# Patient Record
(demographics unavailable — no encounter records)

---

## 2024-11-26 NOTE — HISTORY OF PRESENT ILLNESS
[FreeTextEntry6] : 4 years old female presenting one week ago with runny nose, cough and headache with reduced appetite. Patient was seen in urgent care on 11/20 and was diagnosed with bilateral acute otitis media. An RVP was done and was positive for RSV. Patient was discharged on amoxicillin for 7 days. Mother notes overall improvement, PO intake at baseline and decreasing ear pain. Mother continued pulm medication as prescribed during episode. Denies fever, rash, nausea, vomiting or diarrhea, no sick contacts.

## 2024-11-26 NOTE — DISCUSSION/SUMMARY
[FreeTextEntry1] : 4 years old female with PMH of reactive airway presenting following diagnosis of RSV and bilateral acute otitis media at urgent care on 11/20. Patient completed a course of amoxicillin for 7 days. Physical exam is remarkable for nasal congestion, clear lungs and dull ears with clear fluid bilaterally and mild erythematous tympanic membrane in the left ear. Supportive care advised.  Advised to return to clinic if symptoms persist and go to the ED if worsening pain or respiratory distress.

## 2024-11-26 NOTE — PHYSICAL EXAM
[Clear Effusion] : clear effusion [NL] : warm, clear [Clear] : right tympanic membrane not clear [Erythema] : no erythema

## 2025-01-22 NOTE — REVIEW OF SYSTEMS
[NI] : Allergic [Nl] : Hematologic/Lymphatic [Constipation] : constipation [Developmental Delay] : developmental delay [Frequent URIs] : no frequent upper respiratory infections [Snoring] : no snoring [Apnea] : no apnea [Restlessness] : no restlessness [Daytime Sleepiness] : no daytime sleepiness [Daytime Hyperactivity] : no daytime hyperactivity [Voice Changes] : no voice changes [Frequent Croup] : no frequent croup [Chronic Hoarseness] : no chronic hoarseness [Rhinorrhea] : no rhinorrhea [Nasal Congestion] : no nasal congestion [Sinus Problems] : no sinus problems [Postnasl Drip] : no postnasal drip [Epistaxis] : no epistaxis [Recurrent Ear Infections] : no recurrent ear infections [Recurrent Sinus Infections] : no recurrent sinus infections [Recurrent Throat Infections] : no recurrent throat infections [Tachypnea] : not tachypneic [Wheezing] : no wheezing [Cough] : no cough [Shortness of Breath] : no shortness of breath [Bronchitis] : no bronchitis [Pneumonia] : no pneumonia [Hemoptysis] : no hemoptysis [Sputum] : no sputum [Chronically Infected with ___] : no chronic infections [Spitting Up] : not spitting up [Problems Swallowing] : no problems swallowing [Abdominal Pain] : no abdominal pain [Diarrhea] : no diarrhea [Foul Smelling Stool] : no foul smelling stool [Oily Stool] : no oily stool [Reflux] : no reflux [Nausea] : no nausea [Vomiting] : no vomiting [Food Intolerance] : food tolerant [Abdomen Distention] : abdomen not distended [Rectal Prolapse] : no rectal prolapse [Urgency] : no feelings of urinary urgency [Dysuria] : no dysuria [Muscle Weakness] : no muscle weakness [Seizure] : no seizures [Brain Hemorrhage] : no brain hemorrhage [Head Injury] : no head injury [Sleep Disturbances] : ~T no sleep disturbances [Hyperactive] : no hyperactive behavior [Failure To Thrive] : no failure to thrive [Short Stature] : short stature was not noted

## 2025-01-22 NOTE — CONSULT LETTER
[Dear  ___] : Dear  [unfilled], [Consult Letter:] : I had the pleasure of evaluating your patient, [unfilled]. [Please see my note below.] : Please see my note below. [Consult Closing:] : Thank you very much for allowing me to participate in the care of this patient.  If you have any questions, please do not hesitate to contact me. [Sincerely,] : Sincerely, [FreeTextEntry3] : Wenceslao Hannah MD Pediatric Pulmonology and Sleep Medicine Director [facility] , Pediatric Sleep Disorders,  of Pediatrics, [facility] at [facility], [address] [place] [id number] (P)[phone] (P) [phone] (F) [phone]

## 2025-01-22 NOTE — ASSESSMENT
[FreeTextEntry1] : Impression: Reactive airways disease, vit D deficiency, developmental delay, nonallergic rhinitis, constipation  Reactive airways disease: As mother has a history of asthma, the child's asthma predictive index is positive. Fluticasone 44 was prescribed, 2 puffs twice daily with a spacer and mask.  Albuterol is to be administered prior to activity and every 4 hours as needed.  She had received the influenza vaccine.  If she continues to do well, I plan to start weaning her off routine anti-inflammatory medication.  Constipation: She is receiving a quarter capful of MiraLAX in 8 ounces water once daily. Nonallergic rhinitis:  Cetirizine is to be administered as needed.  Vitamin D insufficiency:  Vitamin D3 prescribed, 2000 international units daily.   Over 50% of time was spent in counseling.  I asked mother to bring the child back for a follow-up visit in 4 month's time. Dictation generated through [employer] [facility]. Note not proofed and edited.

## 2025-01-22 NOTE — PHYSICAL EXAM
[Well Nourished] : well nourished [Well Developed] : well developed [Alert] : ~L alert [Active] : active [No Allergic Shiners] : no allergic shiners [No Drainage] : no drainage [No Conjunctivitis] : no conjunctivitis [Tympanic Membranes Clear] : tympanic membranes were clear [No Nasal Drainage] : no nasal drainage [No Polyps] : no polyps [No Sinus Tenderness] : no sinus tenderness [No Oral Pallor] : no oral pallor [No Oral Cyanosis] : no oral cyanosis [No Exudates] : no exudates [No Postnasal Drip] : no postnasal drip [Tonsil Size ___] : tonsil size [unfilled] [No Tonsillar Enlargement] : no tonsillar enlargement [No Stridor] : no stridor [Absence Of Retractions] : absence of retractions [Symmetric] : symmetric [Good Expansion] : good expansion [No Acc Muscle Use] : no accessory muscle use [Good aeration to bases] : good aeration to bases [Equal Breath Sounds] : equal breath sounds bilaterally [No Crackles] : no crackles [No Rhonchi] : no rhonchi [No Wheezing] : no wheezing [Normal Sinus Rhythm] : normal sinus rhythm [No Heart Murmur] : no heart murmur [Soft, Non-Tender] : soft, non-tender [No Hepatosplenomegaly] : no hepatosplenomegaly [Non Distended] : was not ~L distended [Abdomen Mass (___ Cm)] : no abdominal mass palpated [Abdomen Hernia] : no hernia was discovered [Full ROM] : full range of motion [No Clubbing] : no clubbing [Capillary Refill < 2 secs] : capillary refill less than two seconds [No Cyanosis] : no cyanosis [No Petechiae] : no petechiae [No Kyphoscoliosis] : no kyphoscoliosis [No Contractures] : no contractures [Alert and  Oriented] : alert and oriented [Abnormal Walk] : normal gait [No Abnormal Focal Findings] : no abnormal focal findings [Normal Muscle Tone And Reflexes] : normal muscle tone and reflexes [No Birth Marks] : no birth marks [No Rashes] : no rashes [No Skin Ulcers] : no skin ulcers

## 2025-01-22 NOTE — HISTORY OF PRESENT ILLNESS
[FreeTextEntry1] : This 4-year-old was seen for a follow-up visit.   She was receiving fluticasone 44 2 puffs twice daily with a spacer and mask.  Though we had filled this out for the child to receive this at school, mother states that she is administering this.  She was seen at urgent care in November 2024 and was treated with Amoxil for otitis.  She was positive for the respiratory syncytial virus.  When she is well she does not cough at night and is not nasally congested.  She receives albuterol prior to activity and tolerates activity well.  She had received the influenza vaccine.  She receives MiraLAX and has normal bowel movements.  She drinks 3 glasses of milk a day. As she does well in the summer, fluticasone and albuterol were prescribed to be administered as needed.    When she is well, she has a history of coughing at night once a week.  She receives albuterol prior to activity and tolerates activity well. I had prescribed montelukast but mother stated that the child developed diarrhea and abnormal behavior.  This was then discontinued.   Her respiratory allergy panel by the ImmunoCAP technique was negative.  IgE 12.  25-hydroxy vitamin D level 20 NG per mL.     She started  in 2022.  From the fall 2022, she began having frequent colds associated with coughing with recurrent sick visits.  This continued to be a problem till December 2023.  She had done somewhat better since then.  She has a history of coughing at night once a week and coughing in the mornings once a week.  She remained nasally congested in the winter.  Sleep: She does not snore at night.   Hospitalizations: Never  Emergency room visits: She had been seen on 1 occasion with cough and congestion and another occasion with diarrhea.  December 2023 she was seen with a cold and cough and was found to be positive for the respiratory syncytial virus.  Surgery: Never  She receives speech, occupational therapy and physical therapy.  She is starting to talk.  She had audiology and otolaryngology evaluations.  Her hearing was normal.  She has a history of serous otitis.

## 2025-01-22 NOTE — SOCIAL HISTORY
[Mother] : mother [Brother] : brother [Pre-] : Pre- [None] : none [Smokers in Household] : there are no smokers in the home

## 2025-07-09 NOTE — ASSESSMENT
[FreeTextEntry1] : Impression: Mild persistent bronchial asthma, vit D deficiency, developmental delay, nonallergic rhinitis, constipation  Mild persistent bronchial asthma: As mother has a history of asthma, the child's asthma predictive index is positive. Fluticasone 44 was prescribed, 2 puffs twice daily with a spacer and mask.  Albuterol is to be administered every 4 hours as needed. Medication administration form is being filled out for the child to receive both doses of fluticasone 44 would school on school days.  Constipation: Encouraged mother to administer quarter capful of MiraLAX in 8 ounces of water once daily and to adjust the dose as needed. Nonallergic rhinitis:Respiratory allergy panel is to be checked by the ImmunoCAP technique.  Cetirizine is to be administered as needed.  Vitamin D insufficiency:  Vitamin D3 prescribed, 2000 international units daily.   Over 50% of time was spent in counseling.  I asked mother to bring the child back for a follow-up visit in 4 month's time. Dictation generated through [facility]. Note not proofed and edited.

## 2025-07-09 NOTE — SOCIAL HISTORY
[Mother] : mother [Brother] : brother [None] : none [] :  [Smokers in Household] : there are no smokers in the home

## 2025-07-09 NOTE — HISTORY OF PRESENT ILLNESS
[FreeTextEntry1] : This 4-year-old was seen for a follow-up visit.   She was receiving fluticasone 44 2 puffs twice daily with a spacer and mask.  She was receiving this at school while school had been open.  She is occasionally nasally congested. She had not had any sick visits since last seen.  Mother used the action plan with resolution of symptoms.  She coughs at night perhaps twice a month.  She tolerates activity well without needing albuterol prior to activity.  She was receiving vitamin D3 supplements till mother ran out.  Her eyelids are puffy intermittently.  She receives MiraLAX as needed.  She has very large bowel movements.  She will receive occupational therapy, physical therapy and speech therapy at school.Thou  She had an audiology evaluation.  She was seen at urgent care in November 2024 and was treated with Amoxil for otitis.  She was positive for the respiratory syncytial virus.   She had beeen  receiving albuterol prior to activity and tolerating activity well.   She drinks 3 glasses of milk a day.     When she is well, she has a history of coughing at night once a week. . I had prescribed montelukast but mother stated that the child developed diarrhea and abnormal behavior.  This was then discontinued.   Her respiratory allergy panel by the ImmunoCAP technique was negative.  IgE 12.  25-hydroxy vitamin D level 20 NG per mL.     She started  in 2022.  From the fall 2022, she began having frequent colds associated with coughing with recurrent sick visits.  This continued to be a problem till December 2023.  She had done somewhat better since then.  She has a history of coughing at night once a week and coughing in the mornings once a week.  She had been remaining nasally congested in the winter.  Sleep: She does not snore at night.   Hospitalizations: Never  Emergency room visits: She had been seen on 1 occasion with cough and congestion and another occasion with diarrhea.  December 2023 she was seen with a cold and cough and was found to be positive for the respiratory syncytial virus.  Surgery: Never  She receives speech, occupational therapy and physical therapy.   She had audiology and otolaryngology evaluations.  Her hearing was normal.  She has a history of serous otitis.

## 2025-07-09 NOTE — REVIEW OF SYSTEMS
[NI] : Allergic [Nl] : Hematologic/Lymphatic [Constipation] : constipation [Developmental Delay] : developmental delay [Rhinorrhea] : rhinorrhea [Nasal Congestion] : nasal congestion [Cough] : cough [Frequent URIs] : no frequent upper respiratory infections [Snoring] : no snoring [Apnea] : no apnea [Restlessness] : no restlessness [Daytime Sleepiness] : no daytime sleepiness [Daytime Hyperactivity] : no daytime hyperactivity [Voice Changes] : no voice changes [Frequent Croup] : no frequent croup [Chronic Hoarseness] : no chronic hoarseness [Sinus Problems] : no sinus problems [Postnasl Drip] : no postnasal drip [Epistaxis] : no epistaxis [Recurrent Ear Infections] : no recurrent ear infections [Recurrent Sinus Infections] : no recurrent sinus infections [Recurrent Throat Infections] : no recurrent throat infections [Tachypnea] : not tachypneic [Wheezing] : no wheezing [Shortness of Breath] : no shortness of breath [Bronchitis] : no bronchitis [Pneumonia] : no pneumonia [Hemoptysis] : no hemoptysis [Sputum] : no sputum [Chronically Infected with ___] : no chronic infections [Spitting Up] : not spitting up [Problems Swallowing] : no problems swallowing [Abdominal Pain] : no abdominal pain [Diarrhea] : no diarrhea [Foul Smelling Stool] : no foul smelling stool [Oily Stool] : no oily stool [Reflux] : no reflux [Nausea] : no nausea [Vomiting] : no vomiting [Food Intolerance] : food tolerant [Abdomen Distention] : abdomen not distended [Rectal Prolapse] : no rectal prolapse [Urgency] : no feelings of urinary urgency [Dysuria] : no dysuria [Muscle Weakness] : no muscle weakness [Seizure] : no seizures [Brain Hemorrhage] : no brain hemorrhage [Head Injury] : no head injury [Sleep Disturbances] : ~T no sleep disturbances [Hyperactive] : no hyperactive behavior [Failure To Thrive] : no failure to thrive [Short Stature] : short stature was not noted

## 2025-07-09 NOTE — CONSULT LETTER
[Dear  ___] : Dear  [unfilled], [Consult Letter:] : I had the pleasure of evaluating your patient, [unfilled]. [Please see my note below.] : Please see my note below. [Consult Closing:] : Thank you very much for allowing me to participate in the care of this patient.  If you have any questions, please do not hesitate to contact me. [Sincerely,] : Sincerely, [FreeTextEntry3] : Wenceslao Hannah MD Pediatric Pulmonology and Sleep Medicine Director [facility] , Pediatric Sleep Disorders,  of Pediatrics, [facility] at [facility], [address] [place] [id number] (P)[phone] (P) [phone] (F) [phone]